# Patient Record
Sex: MALE | Race: WHITE | NOT HISPANIC OR LATINO | Employment: FULL TIME | URBAN - METROPOLITAN AREA
[De-identification: names, ages, dates, MRNs, and addresses within clinical notes are randomized per-mention and may not be internally consistent; named-entity substitution may affect disease eponyms.]

---

## 2022-08-06 ENCOUNTER — OFFICE VISIT (OUTPATIENT)
Dept: URGENT CARE | Facility: URGENT CARE | Age: 47
End: 2022-08-06
Payer: COMMERCIAL

## 2022-08-06 VITALS
TEMPERATURE: 97.8 F | DIASTOLIC BLOOD PRESSURE: 81 MMHG | WEIGHT: 205 LBS | SYSTOLIC BLOOD PRESSURE: 123 MMHG | HEART RATE: 83 BPM | OXYGEN SATURATION: 99 %

## 2022-08-06 DIAGNOSIS — S93.401A SPRAIN OF RIGHT ANKLE, UNSPECIFIED LIGAMENT, INITIAL ENCOUNTER: Primary | ICD-10-CM

## 2022-08-06 PROCEDURE — 99203 OFFICE O/P NEW LOW 30 MIN: CPT | Performed by: FAMILY MEDICINE

## 2022-08-06 NOTE — PROGRESS NOTES
Subjective: Last night patient rolled his right ankle pretty badly, got very swollen.  He was able to bear weight afterwards.  He has crutches and this morning he can bear weight but only on his heel.  It is pretty stiff.  He has had injuries to that right leg in the past leading to a foot drop.  His job is mostly nonphysical.    Objective: He has a lot of scar tissue on the right lower leg and a large amount of lateral malleolar swelling.  Ankle appears to be intact on movement but it is held fairly stiffly.  X-rays were done.  I do not see a fracture.He has old findings in the middle fibula and hardware in his foot    Assessment and plan: Right ankle sprain, moderately severe.  He would rather just give it time to heal rather than wear some kind of a splint.  Again he has crutches.  This will probably take 4 to 6 weeks to heal.

## 2022-10-03 ENCOUNTER — HEALTH MAINTENANCE LETTER (OUTPATIENT)
Age: 47
End: 2022-10-03

## 2023-02-17 ENCOUNTER — TRANSFERRED RECORDS (OUTPATIENT)
Dept: HEALTH INFORMATION MANAGEMENT | Facility: CLINIC | Age: 48
End: 2023-02-17

## 2023-02-17 LAB
ALT SERPL-CCNC: 18 IU/L (ref 0–44)
AST SERPL-CCNC: 17 IU/L (ref 0–40)
C TRACH DNA SPEC QL PROBE+SIG AMP: NEGATIVE
CHOLESTEROL (EXTERNAL): 174 MG/DL (ref 100–199)
CREATININE (EXTERNAL): 0.67 MG/DL (ref 0.76–1.27)
GFR ESTIMATED (EXTERNAL): 116 ML/MIN/1.73
GLUCOSE (EXTERNAL): 95 MG/DL (ref 70–99)
HDLC SERPL-MCNC: 55 MG/DL
HIV 1&2 EXT: NORMAL
LDL CHOLESTEROL CALCULATED (EXTERNAL): 109 MG/DL (ref 0–99)
N GONORRHOEA DNA SPEC QL PROBE+SIG AMP: NEGATIVE
POTASSIUM (EXTERNAL): 4.5 MMOL/L (ref 3.5–5.2)
SPECIMEN DESCRIP: NORMAL
SPECIMEN DESCRIPTION: NORMAL
TRIGLYCERIDES (EXTERNAL): 51 MG/DL (ref 0–149)
TSH SERPL-ACNC: 1.06 UIU/ML (ref 0.45–4.5)

## 2023-03-17 ENCOUNTER — PRE VISIT (OUTPATIENT)
Dept: UROLOGY | Facility: CLINIC | Age: 48
End: 2023-03-17
Payer: COMMERCIAL

## 2023-03-17 ENCOUNTER — TRANSCRIBE ORDERS (OUTPATIENT)
Dept: OTHER | Age: 48
End: 2023-03-17

## 2023-03-17 DIAGNOSIS — N52.9 ERECTILE DYSFUNCTION: Primary | ICD-10-CM

## 2023-03-17 DIAGNOSIS — R32 LEAKING OF URINE: ICD-10-CM

## 2023-03-17 DIAGNOSIS — R33.9 URINE RETENTION: ICD-10-CM

## 2023-03-17 NOTE — TELEPHONE ENCOUNTER
Action 23 [facility]   Action Taken  CSS contacted patient for outside records. Patient seen by PCC at [facility] for [employer] employees. Patient stated records are being faxed over. Patient gave consent to update medical records.       [facility] faxed records request to [facility] at [phone].      Action 2023 JTV 3:01 PM    Action Taken CSS called [facility] [place], Phone: [phone]. No Answer -- No voicemail. [facility] sent a message to Nurse to advise of no records.      Action 2023 JTV 8:27 AM    Action Taken CSS called [facility] and spoke with Chasidy. Chasidy confirmed she will fax over records today. Nurse Latoya was updated.     @12:45 PM, [facility] received records. Records sent to urgent scanning for processing. A copy of records sent to provider for review.      MEDICAL RECORDS REQUEST   [facility]  Urology Clinic  [address] [place] [address]  PHONE: [phone]  Fax: [phone]        FUTURE VISIT INFORMATION                                                   Galdino Ozzy Burns, : 1975 scheduled for future visit at [facility]    APPOINTMENT INFORMATION:    Date: 3/21/23    Provider:  Ermelinda Clark PA-C    Reason for Visit/Diagnosis: Urge Incontinence     REFERRAL INFORMATION:    Referring provider:  Radha Huerta CNP    Specialty: Internal Medicine    Referring providers clinic:  [facility]    Clinic contact number:      RECORDS REQUESTED FOR VISIT                                                     NOTES  STATUS/DETAILS   OFFICE NOTE from referring provider  yes   MEDICATION LIST  yes   LABS     URINALYSIS (UA)  yes     PRE-VISIT CHECKLIST      Record collection complete yes   Appointment appropriately scheduled           (right time/right provider) Yes   Joint diagnostic appointment coordinated correctly          (ensure right order & amount of time) Yes    MyChart activation Yes   Questionnaire complete Yes

## 2023-03-20 ENCOUNTER — PATIENT OUTREACH (OUTPATIENT)
Dept: UROLOGY | Facility: CLINIC | Age: 48
End: 2023-03-20
Payer: COMMERCIAL

## 2023-03-20 NOTE — TELEPHONE ENCOUNTER
Writer attempted to contact [facility] in [place] to have them send over pt records for appointment tomorrow.     There were several rings followed by a busy signal.     Will continue to follow as needed

## 2023-03-21 ENCOUNTER — VIRTUAL VISIT (OUTPATIENT)
Dept: UROLOGY | Facility: CLINIC | Age: 48
End: 2023-03-21
Payer: COMMERCIAL

## 2023-03-21 ENCOUNTER — PRE VISIT (OUTPATIENT)
Dept: UROLOGY | Facility: CLINIC | Age: 48
End: 2023-03-21

## 2023-03-21 DIAGNOSIS — N32.81 OVERACTIVE BLADDER: Primary | ICD-10-CM

## 2023-03-21 DIAGNOSIS — N39.41 URGE INCONTINENCE: ICD-10-CM

## 2023-03-21 PROCEDURE — 99204 OFFICE O/P NEW MOD 45 MIN: CPT | Mod: VID | Performed by: PHYSICIAN ASSISTANT

## 2023-03-21 RX ORDER — PROMETHAZINE HYDROCHLORIDE 25 MG/1
1 TABLET ORAL
COMMUNITY

## 2023-03-21 RX ORDER — GABAPENTIN 300 MG/1
300 CAPSULE ORAL
COMMUNITY
Start: 2021-05-17

## 2023-03-21 RX ORDER — VALACYCLOVIR HYDROCHLORIDE 500 MG/1
1 TABLET, FILM COATED ORAL
COMMUNITY
Start: 2022-05-06

## 2023-03-21 RX ORDER — VENLAFAXINE 100 MG/1
TABLET ORAL
COMMUNITY
Start: 2021-06-21

## 2023-03-21 RX ORDER — HYDROXYZINE HYDROCHLORIDE 50 MG/1
50 TABLET, FILM COATED ORAL
COMMUNITY
Start: 2021-05-17

## 2023-03-21 RX ORDER — TRIAMCINOLONE ACETONIDE 1 MG/G
CREAM TOPICAL
COMMUNITY
Start: 2023-02-17

## 2023-03-21 RX ORDER — TROSPIUM CHLORIDE ER 60 MG/1
60 CAPSULE ORAL EVERY MORNING
Qty: 30 CAPSULE | Refills: 11 | Status: SHIPPED | OUTPATIENT
Start: 2023-03-21

## 2023-03-21 RX ORDER — MIRTAZAPINE 30 MG/1
15 TABLET, FILM COATED ORAL AT BEDTIME
COMMUNITY
Start: 2023-02-21

## 2023-03-21 RX ORDER — TAMSULOSIN HYDROCHLORIDE 0.4 MG/1
CAPSULE ORAL
COMMUNITY
Start: 2022-05-21

## 2023-03-21 RX ORDER — ACYCLOVIR 200 MG/1
400 CAPSULE ORAL
COMMUNITY

## 2023-03-21 NOTE — PROGRESS NOTES
Name: Galdino Burns    MRN: [id number]   YOB: 1975                 Chief Complaint:   Lower urinary tract symptoms         Assessment and Plan:   47 year old male with mixed LUTS. Has been taking Flomax for close to a year with partial improvement. Continues to have significant irritative symptoms. We discussed that his fluid intake with consumption of large amounts of coffee and diet soda are a likely culprit. Advise that he cut back on caffeine intake and limit fluids before bed. Discussed additional treatment options to include pelvic floor physical therapy, pharmacotherapy for OAB, or further evaluation / additional treatment for BEAN. It was mutually agreed to proceed as follows:  -Referral to pelvic floor physical therapy.  -Trial of trospium ER 60 mg qAM. Side effects discussed.  -Reduce caffeine consumption.  -Continue tamsulosin.  -Records from [facility] in [place] requested.   -Follow up in 4-6 months to recheck.    Ermelinda Clark PA-C  March 21, 2023    ADDENDUM 3/22/23:  Received outside records [facility].  PSA on 2/17/23 was 0.8  STI testing on 2/17/23 was negative  Continue with the above plan.    Ermelinda Clark PA-C  Department of Urology          History of Present Illness:   Galdino Burns is a 47 year old male seen today via virtual visit in consultation from RUPAL Rincon CNP for evaluation of lower urinary tract symptoms. He is referred from [facility] in [place]; records unavailable at the time of today's visit.    Galdino describes lifelong urinary urgency. However, he has experienced more urge incontinence over the last 1-2 years. Rarely may have some stress urinary incontinence as well. He feels a lack of muscle control. Voids 7-9 times per day, nocturia x 0-2.     He has been taking Flomax since May 2022 which helps occasionally. He does report a generally weaker stream over the last 1-2 years. Feels to not always be emptying  his bladder completely and sometimes double voids. He occasionally has a split stream. Recalls a history of urethritis in college and wonders if there was some sort of structural change to his distal urethra as a result. Denies dysuria or gross hematuria. He had PSA checked through [facility] in Feb 2023 which was normal to his knowledge. No family history of prostate cancer or other prostate problems.    He drinks 8 cups of regular black coffee in the morning. Then 3 cans of diet soda in the afternoon interspersed with water. Also reports drinking a lot of water before bed. Occasionally drinks tea. 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          Past Medical History:   No past medical history on file.         Past Surgical History:   No past surgical history on file.         Social History:     Social History     Tobacco Use     Smoking status: Never     Smokeless tobacco: Never   Substance Use Topics     Alcohol use: Not on file            Family History:   No family history on file.         Allergies:   No Known Allergies         Medications:     No current outpatient medications on file.     No current facility-administered medications for this visit.             Review of Systems:    ROS: 14 point ROS neg other than the symptoms noted above in the HPI and PMH.          Physical Exam:   GENERAL: Healthy, alert and no distress  EYES: Eyes grossly normal to inspection.  No discharge or erythema, or obvious scleral/conjunctival abnormalities.  RESP: No audible wheeze, cough, or visible cyanosis.  No visible retractions or increased work of breathing.    SKIN: Visible skin clear. No significant rash, abnormal pigmentation or lesions.  NEURO: Cranial nerves grossly intact.  Mentation and speech appropriate for age.  PSYCH: Mentation appears normal, affect normal/bright, judgement and insight intact, normal speech and appearance well-groomed.       Labs:    None      Imaging:    None         Video-Visit Details    Type of  service:  Video Visit   Video Start Time: 8:29 AM  Video End Time:8:54 AM    Originating Location (pt. Location): Home    Distant Location (provider location):  Off-site  Platform used for Video Visit: [facility]      45 minutes spent on the date of the encounter doing chart review, patient visit and documentation

## 2023-03-21 NOTE — LETTER
3/21/2023       RE: Galdino Burns  [address]  [facility] [address]     Dear Colleague,    Thank you for referring your patient, Galdino Burns, to the [facility] [place] [facility]. Please see a copy of my visit note below.      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 Has been taking Flomax for close to a year with partial improvement. Continues to have significant irritative symptoms. We discussed that his fluid intake with consumption of large amounts of coffee and diet soda are a likely culprit. Advise that he cut back on caffeine intake and limit fluids before bed. Discussed additional treatment options to include pelvic floor physical therapy, pharmacotherapy for OAB, or further evaluation / additional treatment for BEAN. It was mutually agreed to proceed as follows:  -Referral to pelvic floor physical therapy.  -Trial of trospium ER 60 mg qAM. Side effects discussed.  -Reduce caffeine consumption.  -Continue tamsulosin.  -Records from [facility] in [place] requested.   -Follow up in 4-6 months to recheck.    Ermelinda Clark PA-C  March 21, 2023          History of Present Illness:   Galdino Burns is a 47 year old male seen today via virtual visit in consultation from RUPAL Rincon CNP for evaluation of lower urinary tract symptoms. He is referred from [facility] in [place]; records unavailable at the time of today's visit.    Galdino describes lifelong urinary urgency. However, he has experienced more urge incontinence over the last 1-2 years. Rarely may have some stress urinary incontinence as well. He feels a lack of muscle control. Voids 7-9 times per day, nocturia x 0-2.     He has been taking Flomax since May 2022 which helps  occasionally. He does report a generally weaker stream over the last 1-2 years. Feels to not always be emptying his bladder completely and sometimes double voids. He occasionally has a split stream. Recalls a history of urethritis in college and wonders if there was some sort of structural change to his distal urethra as a result. Denies dysuria or gross hematuria. He had PSA checked [facility] in Feb 2023 which was normal to his knowledge. No family history of prostate cancer or other prostate problems.    He drinks 8 cups of regular black coffee in the morning. Then 3 cans of diet soda in the afternoon interspersed with water. Also reports drinking a lot of water before bed. Occasionally drinks tea. 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          Past Medical History:   No past medical history on file.         Past Surgical History:   No past surgical history on file.         Social History:     Social History     Tobacco Use     Smoking status: Never     Smokeless tobacco: Never   Substance Use Topics     Alcohol use: Not on file            Family History:   No family history on file.         Allergies:   No Known Allergies         Medications:     No current outpatient medications on file.     No current facility-administered medications for this visit.             Review of Systems:    ROS: 14 point ROS neg other than the symptoms noted above in the HPI and PMH.          Physical Exam:   GENERAL: Healthy, alert and no distress  EYES: Eyes grossly normal to inspection.  No discharge or erythema, or obvious scleral/conjunctival abnormalities.  RESP: No audible wheeze, cough, or visible cyanosis.  No visible retractions or increased work of breathing.    SKIN: Visible skin clear. No significant rash, abnormal pigmentation or lesions.  NEURO: Cranial nerves grossly intact.  Mentation and speech appropriate for age.  PSYCH: Mentation appears normal, affect normal/bright, judgement and insight intact, normal speech  and appearance well-groomed.       Labs:    None      Imaging:    None         Video-Visit Details    Type of service:  Video Visit   Video Start Time: 8:29 AM  Video End Time:8:54 AM    Originating Location (pt. Location): Home    Distant Location (provider location):  Off-site  [facility]      45 minutes spent on the date of the encounter doing chart review, patient visit and documentation        Again, thank you for allowing me to participate in the care of your patient.      Sincerely,    Ermelinda Clark PA-C

## 2023-03-21 NOTE — PATIENT INSTRUCTIONS
UROLOGY CLINIC VISIT PATIENT INSTRUCTIONS    REDUCE YOUR CAFFEINE INTAKE. This will likely help your symptoms the most.    Start taking trospium ER (Sanctura XR) 60 mg once daily in the morning at least 30 minutes prior to breakfast. Watch for side effects to include dry eyes, dry mouth, constipation. Notify us if the medication is not covered by insurance.    Continue Flomax (tamsulosin) to promote complete bladder emptying.    Referral to pelvic floor physical therapy for pelvic floor muscle strengthening.     Physical Therapy Referral    Please call [phone] to make an appointment     [facility] - [email or web address]  [facility] - [email or web address]    Locations:    [facility] - U-Ortho PT [facility] - [facility]   [facility] [place]         We will again request records from [facility].    Follow up in 4-6 months to recheck.     If you have any issues, questions or concerns in the meantime, do not hesitate to contact us at [phone] or via [employer].     It was a pleasure meeting with you today.  Thank you for allowing me and my team the privilege of caring for you today.  YOU are the reason we are here, and I truly hope we provided you with the excellent service you deserve.  Please let us know if there is anything else we can do for you so that we can be sure you are leaving completely satisfied with your care experience.

## 2023-03-21 NOTE — LETTER
Date:March 22, 2023      Provider requested that no letter be sent. Do not send.       [facility]

## 2023-03-21 NOTE — NURSING NOTE
Is the patient currently in the state of [place]? YES    Visit mode:VIDEO    If the visit is dropped, the patient can be reconnected by: VIDEO VISIT: Text to cell phone: [phone]    Will anyone else be joining the visit? NO      How would you like to obtain your AVS? MyChart    Are changes needed to the allergy or medication list? NO    Reason for visit: Urgency and incontinence     Shelby Kocher

## 2023-03-22 ENCOUNTER — TELEPHONE (OUTPATIENT)
Dept: UROLOGY | Facility: CLINIC | Age: 48
End: 2023-03-22

## 2023-03-22 NOTE — TELEPHONE ENCOUNTER
Prior Authorization Retail Medication Request    Medication/Dose: trospium (SANCTURA XR) 60 MG CP24 24 hr capsule  ICD code (if different than what is on RX):  Overactive bladder [N32.81]  Previously Tried and Failed:    Rationale:      Insurance Name:  [employer]  Insurance ID:  [id number]    Pharmacy Information (if different than what is on RX)  [facility]:  [id number] [place], [place] - [address]  Phone:  [phone]

## 2023-03-23 ENCOUNTER — THERAPY VISIT (OUTPATIENT)
Dept: PHYSICAL THERAPY | Facility: CLINIC | Age: 48
End: 2023-03-23
Attending: PHYSICIAN ASSISTANT
Payer: COMMERCIAL

## 2023-03-23 DIAGNOSIS — N39.41 URGE INCONTINENCE: ICD-10-CM

## 2023-03-23 DIAGNOSIS — N32.81 OVERACTIVE BLADDER: ICD-10-CM

## 2023-03-23 DIAGNOSIS — M99.05 SOMATIC DYSFUNCTION OF PELVIS REGION: ICD-10-CM

## 2023-03-23 PROCEDURE — 97110 THERAPEUTIC EXERCISES: CPT | Mod: GP | Performed by: PHYSICAL THERAPIST

## 2023-03-23 PROCEDURE — 97535 SELF CARE MNGMENT TRAINING: CPT | Mod: GP | Performed by: PHYSICAL THERAPIST

## 2023-03-23 PROCEDURE — 97161 PT EVAL LOW COMPLEX 20 MIN: CPT | Mod: GP | Performed by: PHYSICAL THERAPIST

## 2023-03-23 PROCEDURE — 97530 THERAPEUTIC ACTIVITIES: CPT | Mod: GP | Performed by: PHYSICAL THERAPIST

## 2023-03-23 NOTE — TELEPHONE ENCOUNTER
Central Prior Authorization Team   Phone: [phone]      .PA Initiation    Medication: trospium (SANCTURA XR) 60 MG CP24 24 hr capsule  Insurance Company: [employer] ([facility]) - Phone [phone] Fax [phone]  Pharmacy Filling the Rx: [id number] [address]  Filling Pharmacy Phone: [phone]  Filling Pharmacy Fax:    Start Date: 3/23/2023
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
## 2023-03-23 NOTE — PROGRESS NOTES
[facility]    OUTPATIENT Physical Therapy ORTHOPEDIC EVALUATION  PLAN OF TREATMENT FOR OUTPATIENT REHABILITATION  (COMPLETE FOR INITIAL CLAIMS ONLY)  Patient's Last Name, First Name, M.I.  YOB: 1975  Galdino Burns    Provider s Name:  [facility]   Medical Record No.  [id number]   Start of Care Date:  03/23/23   Onset Date:   03/21/23 (date patient saw MD for this issue)   Treatment Diagnosis:  urinary urgency/urge incontinence Medical Diagnosis:     Overactive bladder  Urge incontinence  Somatic dysfunction of pelvis region       Goals:     03/23/23 0700   Voiding Patterns   Previous Functional Level Number of times patient experiences urgency during day   Current Functional Level Number of times patient experiences urgency during day   Peformance level Has to urinate again less than 2 hours after finished urinating more than half the time. Always has difficulty postponing urination. Needs to void within 15-30 min of drinking coffee.   STG Target Performance Reduce number of times patient experiences urgency during the day   Performance Level 8-10 times   Rationale continence throughout the day;work toward normal voiding intervals to focus on ADLS, work, or school   Due Date 05/22/23   LTG Target Performance Reduce number of times patient experiences urgency during the day   Performance Level 6-8 times   Rationale continence throughout the day;work toward normal voiding intervals to focus on ADLS, work, or school   Due Date 06/21/23   Fluid Intake    Current Functional Level Consuming high amount of bladder irritants   Performance Level 6 cups of coffee   STG Target Performance Identify bladder irritants/correct fluid intake   Performance Level Reduce coffee consumption to  (3 cups of coffee)   Rationale to facilitate treatment and reduce total treatment  visits   Due Date 05/22/23         Therapy Frequency:  2x/month  Predicted Duration of Therapy Intervention:  90 days    Karin Schroeder, PT                 I CERTIFY THE NEED FOR THESE SERVICES FURNISHED UNDER        THIS PLAN OF TREATMENT AND WHILE UNDER MY CARE     (Physician attestation of this document indicates review and certification of the therapy plan).                     Certification Date From:  03/23/23   Certification Date To:  06/20/23    Referring Provider:  Ermelinda Clark    Initial Assessment        See Epic Evaluation SOC Date: 03/23/23

## 2023-03-23 NOTE — PROGRESS NOTES
03/23/23 1000   AUA (American U[employer] Association) Symptom Score   1. Over the past month, how often have you had a sensation of not emptying your bladder completely after you finished urinating? 3   2. Over the past month, how often have you had to urinate again less than two hours after you finished urinating? 4   3. Over the past month, how often have you found you stopped and started again several times when you urinated? 0   4. Over the past month, how often have you found it difficult to postpone urination? 5   5. Over the past month, how often have you had a weak urine stream? 3   6. Over the past month, how often have you had to push or strain to begin urinating? 0   7. Over the past month, how many times did you typically get up to urinate from the time you went to bed at night until the time you got up in the morning? 1   AUA Score 16

## 2023-03-23 NOTE — PROGRESS NOTES
03/23/23 0700   Voiding Patterns   Previous Functional Level Number of times patient experiences urgency during day   Current Functional Level Number of times patient experiences urgency during day   Peformance level Has to urinate again less than 2 hours after finished urinating more than half the time. Always has difficulty postponing urination. Needs to void within 15-30 min of drinking coffee.   STG Target Performance Reduce number of times patient experiences urgency during the day   Performance Level 8-10 times   Rationale continence throughout the day;work toward normal voiding intervals to focus on ADLS, work, or school   Due Date 05/22/23   LTG Target Performance Reduce number of times patient experiences urgency during the day   Performance Level 6-8 times   Rationale continence throughout the day;work toward normal voiding intervals to focus on ADLS, work, or school   Due Date 06/21/23   Fluid Intake    Current Functional Level Consuming high amount of bladder irritants   Performance Level 6 cups of coffee   STG Target Performance Identify bladder irritants/correct fluid intake   Performance Level Reduce coffee consumption to  (3 cups of coffee)   Rationale to facilitate treatment and reduce total treatment visits   Due Date 05/22/23

## 2023-03-23 NOTE — PROGRESS NOTES
SUBJECTIVE:  Patient reports onset of symptoms urgency and urge incontinence for several years. Symptoms include leaking with urge.  Since onset symptoms have been getting better, worse or staying the same? worse    Urination:  Do you leak on the way to the bathroom or with a strong urge to void? yes    Do you leak with cough,sneeze, jumping, running?No   Any other activities that cause leaking? Yes    Do you have triggers that make you feel you can't wait to go to the bathroom? Yes     When you leak what is the amount? depends    How long can you delay the need to urinate? Not long.   How many times do you get up to urinate at night? 0-2    Can you stop the flow of urine when on the toilet? No  Is the volume of urine passed usually: small. (8 sec rule=  250ml with average bladder storing  400-600ml)    Do you strain to pass urine? No  Do you have a slow or hesitant urinary stream? sometimes  Do you have difficulty initiating the urine stream? No    Fluid intake(one glass is 8oz or one cup) 6 glasses/day, 6 caffinated glasses/day  0  alcohol glasses/day.    Bowel habits:  Frequency of bowel movements?once a day  Consistency of stool? soft formed, hard,   Do you ignore the urge to defecate? No  Do you strain to pass stool? No    Pelvic Pain:  Do you have pain with erections? No  Do you have pain with ejaculation? No  Do you have pain with sitting?  No  Any other activities that produce the pelvic pain    Are you sexually active?Yes  Have you ever been worried for your physical safety? No  Any abdominal or pelvic surgeries? Hip replacement  Are you having any regular exercise?no  Have you practiced the PF(Kegel) exercises for 4 or more weeks?no    OBSERVATION  Right leg shorter with Trendelenburg gait  ROM      Passive Hip ROM: slight decrease in right hip mobility compared to left   MUSCLE PERFORMANCE  Baseline PF tone:normal  PF Tone with cough: not tested  Valsalva: not tested  PF Response quality:  moderate    PALPATION: Non-tender all superficial structures with digital evaluation    FUNCTIONAL QUESTIONNAIRES:  Physical Therapy Initial Evaluation  Subjective:  The history is provided by the patient. No  was used.   Therapist Generated HPI Evaluation  Problem details: On 3/21/2023 patient saw the urologist for issues with urinary urgency and urge incontinence..

## 2023-03-27 NOTE — TELEPHONE ENCOUNTER
PRIOR AUTHORIZATION DENIED    Medication: trospium (SANCTURA XR) 60 MG CP24 24 hr capsule    Denial Date: 3/27/2023    Denial Rational:           Appeal Information:

## 2023-04-20 ENCOUNTER — TRANSFERRED RECORDS (OUTPATIENT)
Dept: MULTI SPECIALTY CLINIC | Facility: CLINIC | Age: 48
End: 2023-04-20

## 2023-04-25 PROBLEM — M99.05 SOMATIC DYSFUNCTION OF PELVIS REGION: Status: RESOLVED | Noted: 2023-03-23 | Resolved: 2023-04-25

## 2023-04-25 NOTE — PROGRESS NOTES
Patient seen for one time evaluation and treatment.  Patient did not return for further treatment and current status is unknown. Patient canceled follow up visit stating it was not needed and would reschedule if needed. Please see initial evaluation for further information.

## 2023-07-19 ENCOUNTER — HOSPITAL ENCOUNTER (EMERGENCY)
Facility: CLINIC | Age: 48
Discharge: HOME | End: 2023-07-19
Attending: FAMILY MEDICINE | Admitting: FAMILY MEDICINE
Payer: COMMERCIAL

## 2023-07-19 ENCOUNTER — TELEPHONE (OUTPATIENT)
Dept: FAMILY MEDICINE | Facility: CLINIC | Age: 48
End: 2023-07-19
Payer: COMMERCIAL

## 2023-07-19 VITALS
DIASTOLIC BLOOD PRESSURE: 83 MMHG | TEMPERATURE: 98.7 F | OXYGEN SATURATION: 100 % | SYSTOLIC BLOOD PRESSURE: 121 MMHG | HEART RATE: 53 BPM | RESPIRATION RATE: 16 BRPM

## 2023-07-19 DIAGNOSIS — S61.412A LACERATION OF LEFT HAND WITHOUT FOREIGN BODY, INITIAL ENCOUNTER: ICD-10-CM

## 2023-07-19 PROCEDURE — 99283 EMERGENCY DEPT VISIT LOW MDM: CPT | Performed by: FAMILY MEDICINE

## 2023-07-19 PROCEDURE — 12002 RPR S/N/AX/GEN/TRNK2.6-7.5CM: CPT

## 2023-07-19 PROCEDURE — 250N000013 HC RX MED GY IP 250 OP 250 PS 637: Performed by: FAMILY MEDICINE

## 2023-07-19 PROCEDURE — 99284 EMERGENCY DEPT VISIT MOD MDM: CPT | Performed by: FAMILY MEDICINE

## 2023-07-19 RX ORDER — IBUPROFEN 800 MG/1
800 TABLET, FILM COATED ORAL ONCE
Status: COMPLETED | OUTPATIENT
Start: 2023-07-19 | End: 2023-07-19

## 2023-07-19 RX ORDER — CEPHALEXIN 500 MG/1
500 CAPSULE ORAL 3 TIMES DAILY
Qty: 21 CAPSULE | Refills: 0 | Status: SHIPPED | OUTPATIENT
Start: 2023-07-19 | End: 2023-07-26

## 2023-07-19 RX ORDER — LIDOCAINE HYDROCHLORIDE 10 MG/ML
30 INJECTION, SOLUTION EPIDURAL; INFILTRATION; INTRACAUDAL; PERINEURAL ONCE
Status: DISCONTINUED | OUTPATIENT
Start: 2023-07-19 | End: 2023-07-19 | Stop reason: HOSPADM

## 2023-07-19 RX ORDER — OXYCODONE AND ACETAMINOPHEN 5; 325 MG/1; MG/1
1 TABLET ORAL EVERY 6 HOURS PRN
Qty: 12 TABLET | Refills: 0 | Status: SHIPPED | OUTPATIENT
Start: 2023-07-19 | End: 2023-07-22

## 2023-07-19 RX ORDER — OXYCODONE AND ACETAMINOPHEN 5; 325 MG/1; MG/1
1 TABLET ORAL ONCE
Status: COMPLETED | OUTPATIENT
Start: 2023-07-19 | End: 2023-07-19

## 2023-07-19 RX ADMIN — IBUPROFEN 800 MG: 800 TABLET ORAL at 13:29

## 2023-07-19 RX ADMIN — OXYCODONE HYDROCHLORIDE AND ACETAMINOPHEN 1 TABLET: 5; 325 TABLET ORAL at 13:29

## 2023-07-19 ASSESSMENT — ACTIVITIES OF DAILY LIVING (ADL)
ADLS_ACUITY_SCORE: 35
ADLS_ACUITY_SCORE: 33

## 2023-07-19 NOTE — DISCHARGE INSTRUCTIONS
Keep wound clean and dry 2 to 3 days sutures out in 7 to 10 days follow-up with your primary clinic start Keflex 3 times a day x1 week.

## 2023-07-19 NOTE — ED NOTES
Pt wanting pain medications but did not want to wait for writer to ask MD. Pt given discharge paperwork.

## 2023-07-19 NOTE — CONSULTS
[facility]  Orthopedic Surgery Consult    Name: Galdino Bunrs  Age: 47 year old  MRN: [id number]  YOB: 1975    Reason for Consult: Left Hand Laceration    Requesting Provider: Marlo Ontiveros MD    Assessment and Plan:     Assessment:  47-year-old male who sustained a hand laceration or injury around 1230 today with a hand saw.    We approximated the laceration today.    Brief Procedure Note:  After verbal informed consent was obtained, and the patient elected to proceed, a brief time out was held in accordance with hospital policy confirming the correct patient, procedure, site, and side 10cc of lidocaine was used to superficially numb the skin around the laceration. Sterile water was used to irrigate the proximal subcutaneous laceration, measuring 4cm by 1cm  and the distal superficial laceration measuring 2cm by 1cm. The left hand was then prepped and draped in the usual sterile fashion using iodine. The proximal laceration extended subcutaneously but not deep into the muscle layer. Both lacerations were contaminated. Only the proximal laceration was approximated as the distal laceration was primarily superficial. 3-0 Prolene was used to approximate skin edges using a simple interrupted pattern. The patient tolerated the procedure well and no immediate complications were observed. He was neurovascularly intact after the procedure.  He was dressed with Xeroform, 4 x 4's and, Kerlix.     He should receive Keflex for 1 week.  He is to follow-up with Dr. Merritt in 1 to 2 weeks for a skin check.    We have messaged our schedulers to set the follow-up appointment.    Plan:  - Plan for OR: None  - Anticoagulation/DVT prophylaxis: None needed  - Antibiotics: Keflex 1 week  - Imaging: None needed  - Activity: No restrictions  - Weight bearing: WBAT  - Pain control: Defer to ED  - Diet: Cleared from our standpoint  - Follow-up: 1-2 weeks with Dr. Merritt  - Disposition:  Home    Staff: Brittanie Merritt MD    Respectfully,    Trung Albert MD  Orthopedic Surgery PGY1  [phone]    Please page me directly with any questions/concerns during regular weekday hours before 5 pm. If there is no response, if it is a weekend, or if it is during evening hours then please page the orthopedic surgery resident on call.    History of Present Illness:     Patient was seen and examined by me. History, PMH, Meds, SH, complete ROS (10 organ systems) and PE reviewed with patient and prior medical records.      47-year-old male that presented with a left hand laceration after using a hand saw at 1230 today.   Laceration extends into the subcutaneous layer but above the muscle layer.  He also has distal superficial lacerations near the finger.  He denies any muscle weakness, numbness, or tingling in the hand.  He states he has full use of all his fingers since the injury without any deficits.      Past Medical History:     History reviewed. No pertinent past medical history.    Past Surgical History:     History reviewed. No pertinent surgical history.    Social History:     Social History     Socioeconomic History    Marital status: Single     Spouse name: None    Number of children: None    Years of education: None    Highest education level: None   Tobacco Use    Smoking status: Former     Types: Cigarettes     Quit date:      Years since quittin.5    Smokeless tobacco: Never       Family History:     No family history on file.    Medications:     Current Facility-Administered Medications   Medication    lidocaine (PF) (XYLOCAINE) 1 % injection 30 mL     Current Outpatient Medications   Medication Sig    acyclovir (ZOVIRAX) 200 MG capsule Take 400 mg by mouth    cephALEXin (KEFLEX) 500 MG capsule Take 1 capsule (500 mg) by mouth 3 times daily for 7 days    gabapentin (NEURONTIN) 300 MG capsule Take 300 mg by mouth    hydrOXYzine (ATARAX) 50 MG tablet Take 50 mg by mouth    mirtazapine  (REMERON) 30 MG tablet Take 15 mg by mouth At Bedtime    Multiple Vitamins-Iron TABS Take 1 tablet by mouth    tamsulosin (FLOMAX) 0.4 MG capsule     valACYclovir (VALTREX) 500 MG tablet Take 1 tablet by mouth 2 times daily    venlafaxine (EFFEXOR) 100 MG tablet     triamcinolone (KENALOG) 0.1 % external cream APPLY THIN LAYER TOPICALLY TO THE AFFECTED AREA TWICE DAILY FOR 1 TO 2 WEEKS. DO NOT USE FOR LONGER THAN 2 WEEKS AT A TIME    trospium (SANCTURA XR) 60 MG CP24 24 hr capsule Take 1 capsule (60 mg) by mouth every morning       Allergies:     Allergies   Allergen Reactions    Peanut-Containing Drug Products Hives    Chocolate Hives and Rash       Review of Systems:     A comprehensive 10 point review of systems (constitutional, ENT, cardiac, peripheral vascular, respiratory, GI, , musculoskeletal, skin, neurological) was performed and found to be negative except as described in this note.      Physical Exam:     Vital Signs: BP (!) 147/90   Pulse 87   Temp 98.7  F (37.1  C) (Oral)   Resp 16   SpO2 98%   General: Resting comfortably in bed, awake, alert, no apparent distress, appears stated age.    Musculoskeletal:  LUE: No gross deformity. Has incision over dorsal aspect of left hand that is deep to muscle subcutaneous layer, not breaching muscle layer. Also has distal superficial lacerations. Both wounds are contaminated. Full active/passive ROM w/o pain or crepitus. Fires wrist extension/flexion, , EPL, intrinsics, FPL. Can make OK sign, jame-cross sign, thumbs up and thumbs into palm without difficulty. SILT in radial, ulnar, and median nerve distribution. Radial pulse 2+ and fingers warm and well-perfused      Imaging:     None for this encounter reviewed    Data:     CBC:  No results found for: WBC, HGB, PLT  BMP:  No results found for: NA, POTASSIUM, CHLORIDE, CO2, BUN, CR, ANIONGAP, MARITA, GLC  Inflammatory Markers:  No results found for: WBC, CRP, SED

## 2023-07-19 NOTE — TELEPHONE ENCOUNTER
Patient called while he was driving himself to either urgent care or the ER. Galdino wanted to know which one he should go to due to power tool accident in his garage.     Left hand (most likely). Removed skin on forefinger between finger and thumb. Patient states he can see the muscle.   Was bleeding a lot. Patient states he was able to get the bleeding to stop.     PCP Pooja Dhillon stated to go to the ER. Patient lives closest to [place] so is heading there.     Patient states that he is not nauseous or dizzy and has a high pain tolerance.   Tool: small hand held router.     Corry Albert, MELAN RN  [facility]

## 2023-07-26 NOTE — TELEPHONE ENCOUNTER
DIAGNOSIS: LEFT HAND LACERATION- ED FOLLOWUP   APPOINTMENT DATE: 08/04/2023   NOTES STATUS DETAILS   DISCHARGE REPORT from the ER Internal 07/19/2023 - [facility] ED   MEDICATION LIST Internal    PHOTOGRAPHS Internal

## 2023-08-04 ENCOUNTER — PRE VISIT (OUTPATIENT)
Dept: ORTHOPEDICS | Facility: CLINIC | Age: 48
End: 2023-08-04

## 2023-09-08 ENCOUNTER — PRE VISIT (OUTPATIENT)
Dept: UROLOGY | Facility: CLINIC | Age: 48
End: 2023-09-08
Payer: COMMERCIAL

## 2023-11-19 ENCOUNTER — HEALTH MAINTENANCE LETTER (OUTPATIENT)
Age: 48
End: 2023-11-19

## 2024-12-29 ENCOUNTER — HEALTH MAINTENANCE LETTER (OUTPATIENT)
Age: 49
End: 2024-12-29

## 2025-06-30 PROBLEM — K40.90 NON-RECURRENT UNILATERAL INGUINAL HERNIA WITHOUT OBSTRUCTION OR GANGRENE: Status: ACTIVE | Noted: 2025-06-27

## 2025-07-02 NOTE — TELEPHONE ENCOUNTER
FUTURE VISIT INFORMATION      SURGERY INFORMATION:  Date: 7/15/25  [place] [facility] [place]geon:  Dr. Marlo Cat  Anesthesia Type:  General  Procedure: HERNIORRHAPHY, INGUINAL, ROBOT-ASSISTED       RECORDS REQUESTED FROM:       Primary Care Provider: Radha Smith NP    Pertinent Medical History: urge incontinence     Most recent EKG+ Tracin23

## 2025-07-09 ENCOUNTER — LAB (OUTPATIENT)
Dept: LAB | Facility: CLINIC | Age: 50
End: 2025-07-09
Payer: COMMERCIAL

## 2025-07-09 ENCOUNTER — OFFICE VISIT (OUTPATIENT)
Dept: SURGERY | Facility: CLINIC | Age: 50
End: 2025-07-09
Payer: COMMERCIAL

## 2025-07-09 ENCOUNTER — ANESTHESIA EVENT (OUTPATIENT)
Dept: SURGERY | Facility: AMBULATORY SURGERY CENTER | Age: 50
End: 2025-07-09
Payer: COMMERCIAL

## 2025-07-09 ENCOUNTER — PRE VISIT (OUTPATIENT)
Dept: SURGERY | Facility: CLINIC | Age: 50
End: 2025-07-09

## 2025-07-09 VITALS
RESPIRATION RATE: 16 BRPM | OXYGEN SATURATION: 98 % | WEIGHT: 177 LBS | HEIGHT: 73 IN | SYSTOLIC BLOOD PRESSURE: 111 MMHG | HEART RATE: 59 BPM | DIASTOLIC BLOOD PRESSURE: 72 MMHG | TEMPERATURE: 98.8 F | BODY MASS INDEX: 23.46 KG/M2

## 2025-07-09 DIAGNOSIS — Z01.818 PREOP EXAMINATION: Primary | ICD-10-CM

## 2025-07-09 DIAGNOSIS — K40.90 NON-RECURRENT UNILATERAL INGUINAL HERNIA WITHOUT OBSTRUCTION OR GANGRENE: ICD-10-CM

## 2025-07-09 DIAGNOSIS — Z01.818 PREOP EXAMINATION: ICD-10-CM

## 2025-07-09 LAB
CREAT SERPL-MCNC: 0.91 MG/DL (ref 0.67–1.17)
EGFRCR SERPLBLD CKD-EPI 2021: >90 ML/MIN/1.73M2
ERYTHROCYTE [DISTWIDTH] IN BLOOD BY AUTOMATED COUNT: 12.5 % (ref 10–15)
HCT VFR BLD AUTO: 42.4 % (ref 40–53)
HGB BLD-MCNC: 13.9 G/DL (ref 13.3–17.7)
MCH RBC QN AUTO: 29.7 PG (ref 26.5–33)
MCHC RBC AUTO-ENTMCNC: 32.8 G/DL (ref 31.5–36.5)
MCV RBC AUTO: 91 FL (ref 78–100)
PLATELET # BLD AUTO: 244 10E3/UL (ref 150–450)
RBC # BLD AUTO: 4.68 10E6/UL (ref 4.4–5.9)
WBC # BLD AUTO: 6 10E3/UL (ref 4–11)

## 2025-07-09 PROCEDURE — 36415 COLL VENOUS BLD VENIPUNCTURE: CPT | Performed by: PATHOLOGY

## 2025-07-09 PROCEDURE — 82565 ASSAY OF CREATININE: CPT | Performed by: PATHOLOGY

## 2025-07-09 PROCEDURE — 99203 OFFICE O/P NEW LOW 30 MIN: CPT | Performed by: CLINICAL NURSE SPECIALIST

## 2025-07-09 PROCEDURE — 85027 COMPLETE CBC AUTOMATED: CPT | Performed by: PATHOLOGY

## 2025-07-09 RX ORDER — KETOCONAZOLE 20 MG/ML
SHAMPOO, SUSPENSION TOPICAL DAILY PRN
COMMUNITY
Start: 2025-01-07

## 2025-07-09 ASSESSMENT — ENCOUNTER SYMPTOMS
SEIZURES: 1
DYSRHYTHMIAS: 0

## 2025-07-09 ASSESSMENT — PAIN SCALES - GENERAL: PAINLEVEL_OUTOF10: NO PAIN (0)

## 2025-07-09 ASSESSMENT — LIFESTYLE VARIABLES: TOBACCO_USE: 1
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
## 2025-07-09 NOTE — PATIENT INSTRUCTIONS
Preparing for Your Surgery      Name:  Galdino Burns   MRN:  [id number]   :  1975   Today's Date:  2025       [employer] [place] [employer] [address] [employer] Project                                Timeline 2025 -2025    This project will affect travel to the [facility], as well as the [facility].    Please check the [employer] [address] project website for the most up to date information and give yourself additional time to reach your destination.      Arriving for surgery:  Surgery date:  7/15/25  Arrival time:  11.05AM    Restrictions due to COVID 19:    Please maintain social distance.  Masking is optional.      parking is available for anyone with mobility limitations or disabilities. (Monday- Friday 7 am- 5 pm)    Please come to:    [facility]  [address] [place] [phone]    Please check in on the 5th floor at the Ambulatory Surgery Center.      What can I eat or drink?    -  You may eat and drink normally until 8 hours prior to arrival  time. (Until 3.05AM)  -  You may have clear liquids until 2 hours prior to arrival  time. (Until 9.05AM)    Examples of clear liquids:  Water  Clear broth  Juices (apple, white grape, white cranberry  and cider) without pulp  Noncarbonated, powder based beverages  (lemonade and Mikey-Aid)  Sodas (Sprite, 7-Up, ginger ale and seltzer)  Coffee or tea (without milk or cream)  Gatorade      Which medicines can I take?    Hold Aspirin for 7 days before surgery.   Hold Multivitamins for 7 days before surgery.  Hold Supplements for 7 days before surgery.  Hold Ibuprofen (Advil, Motrin) for 1 day before surgery--unless otherwise directed by surgeon.  Hold Naproxen (Aleve) for 4 days before surgery.    No alcohol or cannabis including edibles products for 24 hours prior to procedure.      -  DO NOT take the following medications the day of  surgery:  External cream.      -  PLEASE TAKE the following medications the day of surgery:   None     Valacyclovir can continue at bedtime per usual.    How do I prepare myself?  - Please take 2 showers (one the night prior to surgery and one the morning of surgery) using Scrubcare or Hibiclens soap.    Use this soap only from the neck to your toes. Avoid genital area      Leave the soap on your skin for one minute--then rinse thoroughly.      You may use your own shampoo and conditioner. No other hair products.   - Please remove all jewelry and body piercings.  - No lotions, deodorants or fragrance.  - No makeup or fingernail polish.   - Bring your ID and insurance card.    -If you have a Deep Brain Stimulator, a Spinal Cord Stimulator, or any implanted Neuro Device, you must bring the remote to the Surgery Center.         ALL PATIENTS ARE REQUIRED TO HAVE A RESPONSIBLE ADULT TO DRIVE AND BE IN ATTENDANCE WITH THEM FOR 24 HOURS FOLLOWING SURGERY.     Covid testing policy as of 12/06/2022  Your surgeon will notify and schedule you for a COVID test if one is needed before surgery--please direct any questions or COVID symptoms to your surgeon      Questions or Concerns:    -For questions regarding the day of surgery, please contact the Ambulatory Surgery Center at [phone].    -If you have health changes between today and your surgery, please contact your surgeon.     - For questions after surgery, please contact your surgeon's office.

## 2025-07-15 ENCOUNTER — ANESTHESIA (OUTPATIENT)
Dept: SURGERY | Facility: AMBULATORY SURGERY CENTER | Age: 50
End: 2025-07-15
Payer: COMMERCIAL

## 2025-07-15 ENCOUNTER — HOSPITAL ENCOUNTER (OUTPATIENT)
Facility: AMBULATORY SURGERY CENTER | Age: 50
Discharge: HOME | End: 2025-07-15
Attending: SURGERY
Payer: COMMERCIAL

## 2025-07-15 VITALS
HEIGHT: 73 IN | TEMPERATURE: 98 F | SYSTOLIC BLOOD PRESSURE: 117 MMHG | HEART RATE: 50 BPM | DIASTOLIC BLOOD PRESSURE: 78 MMHG | BODY MASS INDEX: 23.59 KG/M2 | WEIGHT: 178 LBS | OXYGEN SATURATION: 100 % | RESPIRATION RATE: 16 BRPM

## 2025-07-15 DIAGNOSIS — K40.90 NON-RECURRENT UNILATERAL INGUINAL HERNIA WITHOUT OBSTRUCTION OR GANGRENE: Primary | ICD-10-CM

## 2025-07-15 PROCEDURE — S2900 ROBOTIC SURGICAL SYSTEM: HCPCS | Performed by: SURGERY

## 2025-07-15 PROCEDURE — 49650 LAP ING HERNIA REPAIR INIT: CPT | Mod: GC | Performed by: SURGERY

## 2025-07-15 PROCEDURE — 49650 LAP ING HERNIA REPAIR INIT: CPT | Performed by: SURGERY

## 2025-07-15 DEVICE — MESH PROGRIP LAPAROSCOPIC 5.9X3.9" PARIETEX SELF-FIX LPG1510: Type: IMPLANTABLE DEVICE | Site: ABDOMEN | Status: FUNCTIONAL

## 2025-07-15 RX ORDER — CEFAZOLIN SODIUM 2 G/50ML
2 SOLUTION INTRAVENOUS SEE ADMIN INSTRUCTIONS
Status: DISCONTINUED | OUTPATIENT
Start: 2025-07-15 | End: 2025-07-15 | Stop reason: HOSPADM

## 2025-07-15 RX ORDER — DEXAMETHASONE SODIUM PHOSPHATE 10 MG/ML
4 INJECTION, SOLUTION INTRAMUSCULAR; INTRAVENOUS
Status: DISCONTINUED | OUTPATIENT
Start: 2025-07-15 | End: 2025-07-15 | Stop reason: HOSPADM

## 2025-07-15 RX ORDER — ONDANSETRON 2 MG/ML
4 INJECTION INTRAMUSCULAR; INTRAVENOUS EVERY 30 MIN PRN
Status: DISCONTINUED | OUTPATIENT
Start: 2025-07-15 | End: 2025-07-16 | Stop reason: HOSPADM

## 2025-07-15 RX ORDER — ONDANSETRON 2 MG/ML
INJECTION INTRAMUSCULAR; INTRAVENOUS PRN
Status: DISCONTINUED | OUTPATIENT
Start: 2025-07-15 | End: 2025-07-15

## 2025-07-15 RX ORDER — PROPOFOL 10 MG/ML
INJECTION, EMULSION INTRAVENOUS CONTINUOUS PRN
Status: DISCONTINUED | OUTPATIENT
Start: 2025-07-15 | End: 2025-07-15

## 2025-07-15 RX ORDER — LABETALOL HYDROCHLORIDE 5 MG/ML
10 INJECTION, SOLUTION INTRAVENOUS
Status: DISCONTINUED | OUTPATIENT
Start: 2025-07-15 | End: 2025-07-15 | Stop reason: HOSPADM

## 2025-07-15 RX ORDER — HYDROMORPHONE HYDROCHLORIDE 1 MG/ML
0.2 INJECTION, SOLUTION INTRAMUSCULAR; INTRAVENOUS; SUBCUTANEOUS EVERY 5 MIN PRN
Status: DISCONTINUED | OUTPATIENT
Start: 2025-07-15 | End: 2025-07-16 | Stop reason: HOSPADM

## 2025-07-15 RX ORDER — NALOXONE HYDROCHLORIDE 0.4 MG/ML
0.1 INJECTION, SOLUTION INTRAMUSCULAR; INTRAVENOUS; SUBCUTANEOUS
Status: DISCONTINUED | OUTPATIENT
Start: 2025-07-15 | End: 2025-07-16 | Stop reason: HOSPADM

## 2025-07-15 RX ORDER — OXYCODONE HYDROCHLORIDE 5 MG/1
5 TABLET ORAL
Status: COMPLETED | OUTPATIENT
Start: 2025-07-15 | End: 2025-07-15

## 2025-07-15 RX ORDER — ACETAMINOPHEN 325 MG/1
975 TABLET ORAL ONCE
Status: COMPLETED | OUTPATIENT
Start: 2025-07-15 | End: 2025-07-15

## 2025-07-15 RX ORDER — ONDANSETRON 2 MG/ML
4 INJECTION INTRAMUSCULAR; INTRAVENOUS EVERY 30 MIN PRN
Status: DISCONTINUED | OUTPATIENT
Start: 2025-07-15 | End: 2025-07-15 | Stop reason: HOSPADM

## 2025-07-15 RX ORDER — MEPERIDINE HYDROCHLORIDE 25 MG/ML
12.5 INJECTION INTRAMUSCULAR; INTRAVENOUS; SUBCUTANEOUS EVERY 5 MIN PRN
Status: DISCONTINUED | OUTPATIENT
Start: 2025-07-15 | End: 2025-07-15 | Stop reason: HOSPADM

## 2025-07-15 RX ORDER — DEXAMETHASONE SODIUM PHOSPHATE 10 MG/ML
4 INJECTION, SOLUTION INTRAMUSCULAR; INTRAVENOUS
Status: DISCONTINUED | OUTPATIENT
Start: 2025-07-15 | End: 2025-07-16 | Stop reason: HOSPADM

## 2025-07-15 RX ORDER — ONDANSETRON 4 MG/1
4 TABLET, ORALLY DISINTEGRATING ORAL EVERY 30 MIN PRN
Status: DISCONTINUED | OUTPATIENT
Start: 2025-07-15 | End: 2025-07-16 | Stop reason: HOSPADM

## 2025-07-15 RX ORDER — KETOROLAC TROMETHAMINE 30 MG/ML
INJECTION, SOLUTION INTRAMUSCULAR; INTRAVENOUS PRN
Status: DISCONTINUED | OUTPATIENT
Start: 2025-07-15 | End: 2025-07-15

## 2025-07-15 RX ORDER — FENTANYL CITRATE 50 UG/ML
25 INJECTION, SOLUTION INTRAMUSCULAR; INTRAVENOUS EVERY 5 MIN PRN
Status: DISCONTINUED | OUTPATIENT
Start: 2025-07-15 | End: 2025-07-15 | Stop reason: HOSPADM

## 2025-07-15 RX ORDER — DEXAMETHASONE SODIUM PHOSPHATE 4 MG/ML
INJECTION, SOLUTION INTRA-ARTICULAR; INTRALESIONAL; INTRAMUSCULAR; INTRAVENOUS; SOFT TISSUE PRN
Status: DISCONTINUED | OUTPATIENT
Start: 2025-07-15 | End: 2025-07-15

## 2025-07-15 RX ORDER — CEFAZOLIN SODIUM 2 G/50ML
2 SOLUTION INTRAVENOUS
Status: COMPLETED | OUTPATIENT
Start: 2025-07-15 | End: 2025-07-15

## 2025-07-15 RX ORDER — HYDROMORPHONE HYDROCHLORIDE 1 MG/ML
0.4 INJECTION, SOLUTION INTRAMUSCULAR; INTRAVENOUS; SUBCUTANEOUS EVERY 5 MIN PRN
Status: DISCONTINUED | OUTPATIENT
Start: 2025-07-15 | End: 2025-07-15 | Stop reason: HOSPADM

## 2025-07-15 RX ORDER — ONDANSETRON 4 MG/1
4 TABLET, ORALLY DISINTEGRATING ORAL EVERY 30 MIN PRN
Status: DISCONTINUED | OUTPATIENT
Start: 2025-07-15 | End: 2025-07-15 | Stop reason: HOSPADM

## 2025-07-15 RX ORDER — SODIUM CHLORIDE, SODIUM LACTATE, POTASSIUM CHLORIDE, CALCIUM CHLORIDE 600; 310; 30; 20 MG/100ML; MG/100ML; MG/100ML; MG/100ML
INJECTION, SOLUTION INTRAVENOUS CONTINUOUS
Status: DISCONTINUED | OUTPATIENT
Start: 2025-07-15 | End: 2025-07-16 | Stop reason: HOSPADM

## 2025-07-15 RX ORDER — HYDROMORPHONE HYDROCHLORIDE 1 MG/ML
0.2 INJECTION, SOLUTION INTRAMUSCULAR; INTRAVENOUS; SUBCUTANEOUS EVERY 5 MIN PRN
Status: DISCONTINUED | OUTPATIENT
Start: 2025-07-15 | End: 2025-07-15 | Stop reason: HOSPADM

## 2025-07-15 RX ORDER — FENTANYL CITRATE 50 UG/ML
50 INJECTION, SOLUTION INTRAMUSCULAR; INTRAVENOUS EVERY 5 MIN PRN
Status: DISCONTINUED | OUTPATIENT
Start: 2025-07-15 | End: 2025-07-15 | Stop reason: HOSPADM

## 2025-07-15 RX ORDER — FENTANYL CITRATE 50 UG/ML
50 INJECTION, SOLUTION INTRAMUSCULAR; INTRAVENOUS EVERY 5 MIN PRN
Status: DISCONTINUED | OUTPATIENT
Start: 2025-07-15 | End: 2025-07-16 | Stop reason: HOSPADM

## 2025-07-15 RX ORDER — PROPOFOL 10 MG/ML
INJECTION, EMULSION INTRAVENOUS PRN
Status: DISCONTINUED | OUTPATIENT
Start: 2025-07-15 | End: 2025-07-15

## 2025-07-15 RX ORDER — FENTANYL CITRATE 50 UG/ML
25 INJECTION, SOLUTION INTRAMUSCULAR; INTRAVENOUS EVERY 5 MIN PRN
Status: DISCONTINUED | OUTPATIENT
Start: 2025-07-15 | End: 2025-07-16 | Stop reason: HOSPADM

## 2025-07-15 RX ORDER — FENTANYL CITRATE 50 UG/ML
25 INJECTION, SOLUTION INTRAMUSCULAR; INTRAVENOUS
Status: DISCONTINUED | OUTPATIENT
Start: 2025-07-15 | End: 2025-07-16 | Stop reason: HOSPADM

## 2025-07-15 RX ORDER — HYDROMORPHONE HYDROCHLORIDE 1 MG/ML
0.4 INJECTION, SOLUTION INTRAMUSCULAR; INTRAVENOUS; SUBCUTANEOUS EVERY 5 MIN PRN
Status: DISCONTINUED | OUTPATIENT
Start: 2025-07-15 | End: 2025-07-16 | Stop reason: HOSPADM

## 2025-07-15 RX ORDER — SODIUM CHLORIDE, SODIUM LACTATE, POTASSIUM CHLORIDE, CALCIUM CHLORIDE 600; 310; 30; 20 MG/100ML; MG/100ML; MG/100ML; MG/100ML
INJECTION, SOLUTION INTRAVENOUS CONTINUOUS
Status: DISCONTINUED | OUTPATIENT
Start: 2025-07-15 | End: 2025-07-15 | Stop reason: HOSPADM

## 2025-07-15 RX ORDER — OXYCODONE HYDROCHLORIDE 5 MG/1
5-10 TABLET ORAL EVERY 4 HOURS PRN
Qty: 20 TABLET | Refills: 0 | Status: SHIPPED | OUTPATIENT
Start: 2025-07-15

## 2025-07-15 RX ORDER — LIDOCAINE HYDROCHLORIDE 20 MG/ML
INJECTION, SOLUTION INFILTRATION; PERINEURAL PRN
Status: DISCONTINUED | OUTPATIENT
Start: 2025-07-15 | End: 2025-07-15

## 2025-07-15 RX ORDER — FENTANYL CITRATE 50 UG/ML
INJECTION, SOLUTION INTRAMUSCULAR; INTRAVENOUS PRN
Status: DISCONTINUED | OUTPATIENT
Start: 2025-07-15 | End: 2025-07-15

## 2025-07-15 RX ORDER — LIDOCAINE 40 MG/G
CREAM TOPICAL
Status: DISCONTINUED | OUTPATIENT
Start: 2025-07-15 | End: 2025-07-15 | Stop reason: HOSPADM

## 2025-07-15 RX ORDER — OXYCODONE HYDROCHLORIDE 5 MG/1
10 TABLET ORAL
Status: DISCONTINUED | OUTPATIENT
Start: 2025-07-15 | End: 2025-07-16 | Stop reason: HOSPADM

## 2025-07-15 RX ORDER — NALOXONE HYDROCHLORIDE 0.4 MG/ML
0.1 INJECTION, SOLUTION INTRAMUSCULAR; INTRAVENOUS; SUBCUTANEOUS
Status: DISCONTINUED | OUTPATIENT
Start: 2025-07-15 | End: 2025-07-15 | Stop reason: HOSPADM

## 2025-07-15 RX ADMIN — Medication 50 MG: at 12:44

## 2025-07-15 RX ADMIN — SODIUM CHLORIDE, SODIUM LACTATE, POTASSIUM CHLORIDE, CALCIUM CHLORIDE: 600; 310; 30; 20 INJECTION, SOLUTION INTRAVENOUS at 11:27

## 2025-07-15 RX ADMIN — PROPOFOL 200 MG: 10 INJECTION, EMULSION INTRAVENOUS at 12:44

## 2025-07-15 RX ADMIN — CEFAZOLIN SODIUM 2 G: 2 SOLUTION INTRAVENOUS at 12:38

## 2025-07-15 RX ADMIN — ONDANSETRON 4 MG: 2 INJECTION INTRAMUSCULAR; INTRAVENOUS at 13:50

## 2025-07-15 RX ADMIN — KETOROLAC TROMETHAMINE 15 MG: 30 INJECTION, SOLUTION INTRAMUSCULAR; INTRAVENOUS at 13:50

## 2025-07-15 RX ADMIN — DEXAMETHASONE SODIUM PHOSPHATE 4 MG: 4 INJECTION, SOLUTION INTRA-ARTICULAR; INTRALESIONAL; INTRAMUSCULAR; INTRAVENOUS; SOFT TISSUE at 12:54

## 2025-07-15 RX ADMIN — Medication 20 MG: at 13:31

## 2025-07-15 RX ADMIN — OXYCODONE HYDROCHLORIDE 5 MG: 5 TABLET ORAL at 14:09

## 2025-07-15 RX ADMIN — FENTANYL CITRATE 25 MCG: 50 INJECTION, SOLUTION INTRAMUSCULAR; INTRAVENOUS at 14:27

## 2025-07-15 RX ADMIN — FENTANYL CITRATE 100 MCG: 50 INJECTION, SOLUTION INTRAMUSCULAR; INTRAVENOUS at 12:43

## 2025-07-15 RX ADMIN — PROPOFOL 200 MCG/KG/MIN: 10 INJECTION, EMULSION INTRAVENOUS at 12:43

## 2025-07-15 RX ADMIN — ACETAMINOPHEN 975 MG: 325 TABLET ORAL at 11:25

## 2025-07-15 RX ADMIN — LIDOCAINE HYDROCHLORIDE 100 MG: 20 INJECTION, SOLUTION INFILTRATION; PERINEURAL at 12:44

## 2025-07-15 ASSESSMENT — ENCOUNTER SYMPTOMS: SEIZURES: 1
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
## 2025-07-15 NOTE — ANESTHESIA CARE TRANSFER NOTE
Patient: Galdino Burns    Procedure: Procedure(s):  HERNIORRHAPHY, INGUINAL, ROBOT-ASSISTED       Diagnosis: Non-recurrent unilateral inguinal hernia without obstruction or gangrene [K40.90]  Diagnosis Additional Information: No value filed.    Anesthesia Type:   General     Note:    Oropharynx: oropharynx clear of all foreign objects and spontaneously breathing  Level of Consciousness: awake  Oxygen Supplementation: face mask  Level of Supplemental Oxygen (L/min / FiO2): 6  Independent Airway: airway patency satisfactory and stable  Dentition: dentition unchanged  Vital Signs Stable: post-procedure vital signs reviewed and stable  Report to RN Given: handoff report given  Patient transferred to: PACU    Handoff Report: Identifed the Patient, Identified the Reponsible Provider, Reviewed the pertinent medical history, Discussed the surgical course, Reviewed Intra-OP anesthesia mangement and issues during anesthesia, Set expectations for post-procedure period and Allowed opportunity for questions and acknowledgement of understanding    Vitals:  Vitals Value Taken Time   BP     Temp     Pulse     Resp     SpO2         Electronically Signed By: RUPAL Burgess CRNA  July 15, 2025  2:07 PM

## 2025-07-15 NOTE — PROGRESS NOTES
Unable to reach Sudhir by phone x2. Sent secure compliant text with one time reply allowed with update.

## 2025-07-15 NOTE — ANESTHESIA POSTPROCEDURE EVALUATION
Patient: Galdino Burns    Procedure: Procedure(s):  HERNIORRHAPHY, INGUINAL, ROBOT-ASSISTED       Anesthesia Type:  General    Note:  Disposition: Outpatient   Postop Pain Control: Uneventful            Sign Out: Well controlled pain   PONV: No   Neuro/Psych: Uneventful            Sign Out: Acceptable/Baseline neuro status   Airway/Respiratory: Uneventful            Sign Out: Acceptable/Baseline resp. status   CV/Hemodynamics: Uneventful            Sign Out: Acceptable CV status; No obvious hypovolemia; No obvious fluid overload   Other NRE: NONE   DID A NON-ROUTINE EVENT OCCUR? No           Last vitals:  Vitals Value Taken Time   /80 07/15/25 14:30   Temp 36.8  C (98.2  F) 07/15/25 14:30   Pulse 55 07/15/25 14:33   Resp 6 07/15/25 14:33   SpO2 98 % 07/15/25 14:33   Vitals shown include unfiled device data.    Electronically Signed By: Kevin Negron MD  July 15, 2025  3:43 PM

## 2025-07-15 NOTE — DISCHARGE INSTRUCTIONS
[facility]  Home Care Following Anesthesia  For 24 hours after surgery:  Get plenty of rest.  A responsible adult must stay with you for at least 24 hours after you leave the surgery center.  Do not drive or use heavy equipment.  If you have weakness or tingling, don't drive or use heavy equipment until this feeling goes away.   Do not drink alcohol.   Avoid strenuous or risky activities.  Ask for help when climbing stairs.  You may feel lightheaded.  IF so, sit for a few minutes before standing.  Have someone help you get up.   If you have nausea (feel sick to your stomach): Drink only clear liquids such as apple juice, ginger ale, broth or 7-Up.  Rest may also help.  Be sure to drink enough fluids.  Move to a regular diet as you feel able.   You may have a slight fever.  Call the doctor if your fever is over 100 F (37.7 C) (taken under the tongue) or lasts longer than 24 hours.  You may have a dry mouth, a sore throat, muscle aches or trouble sleeping. These should go away after 24 hours.  Do not make important or legal decisions.   It is recommended to avoid smoking.               Tips for taking pain medications  To get the best pain relief possible, remember these points:  Take pain medications as directed, before pain becomes severe.  Pain medication can upset your stomach: taking it with food may help.  Constipation is a common side effect of pain medication. Drink plenty of  fluids.  Eat foods high in fiber. Take a stool softener if recommended by your doctor or pharmacist.  Do not drink alcohol, drive or operate machinery while taking pain medications.  Ask about other ways to control pain, such as with heat, ice or relaxation.    Tylenol/Acetaminophen Consumption    If you feel your pain relief is insufficient, you may take Tylenol/Acetaminophen in addition to your narcotic pain medication.   Be careful not to exceed 4,000 mg of Tylenol/Acetaminophen in a 24 hour  period from all sources.  If you are taking extra strength Tylenol/acetaminophen (500 mg), the maximum dose is 8 tablets in 24 hours.  If you are taking regular strength acetaminophen (325 mg), the maximum dose is 12 tablets in 24 hours.  Last dose of Tylenol received at 1150am 975mg. May have next dose after 550pm.  May have ibuprofen after 745pm.     Call a doctor for any of the following:  Signs of infection (fever, growing tenderness at the surgery site, a large amount of drainage or bleeding, severe pain, foul-smelling drainage, redness, swelling).  It has been over 8 to 10 hours since surgery and you are still not able to urinate (pass water).  Headache for over 24 hours.  Numbness, tingling or weakness the day after surgery (if you had spinal anesthesia).  Signs of Covid-19 infection (temperature over 100 degrees, shortness of breath, cough, loss of taste/smell, generalized body aches, persistent headache, chills, sore throat, nausea/vomiting/diarrhea)    Your doctor is:       Dr. Marlo Cat, General Surgery: [phone]               After hours and weekends call the hospital @ [phone] and ask for the resident on call for:  General Surgery  For emergency care, call the:  [facility]:  [phone] (TTY for hearing impaired: [phone])

## 2025-07-15 NOTE — ANESTHESIA PROCEDURE NOTES
Airway       Patient location during procedure: OR       Procedure Start/Stop Times: 7/15/2025 12:52 PM  Staff -        Anesthesiologist:  Kevin Negron MD       CRNA: Lottie Patel APRN CRNA       Performed By: CRNA and other anesthesia staff  Consent for Airway        Urgency: elective  Indications and Patient Condition       Indications for airway management: izabela-procedural and airway protection         Mask difficulty assessment: 2 - vent by mask + OA or adjuvant +/- NMBA    Final Airway Details       Final airway type: endotracheal airway       Successful airway: ETT - single and Oral  Endotracheal Airway Details        ETT size (mm): 7.5       Cuffed: yes       Cuff volume (mL): 8       Successful intubation technique: direct laryngoscopy       DL Blade Type: MAC 3       Grade View of Cords: 3 (omega shaped epiglottis with no view of vocal cords with DL. Blind placement with success.)       Adjucts: stylet       Position: Right       Measured from: gums/teeth       Secured at (cm): 22       Bite block used: None    Post intubation assessment        Placement verified by: capnometry, equal breath sounds and chest rise        Number of attempts at approach: 2 (first attempt by medical student. second attmept by anesthesiologist.)       Secured with: tape       Ease of procedure: easy       Dentition: Intact    Medication(s) Administered   Medication Administration Time: 7/15/2025 12:52 PM

## 2025-07-15 NOTE — OP NOTE
[facility]    Operative Note    Pre-operative diagnosis: Non-recurrent unilateral inguinal hernia without obstruction or gangrene [K40.90]   Post-operative diagnosis * same   Procedure: Procedure(s):  HERNIORRHAPHY, INGUINAL, ROBOT-ASSISTED   Surgeon: Surgeons and Role:     * Marlo Cat MD - Primary     * Anai Acevedo MD - Resident - Assisting   Anesthesia: General    Estimated blood loss: 10 ml   Drains:    Specimens: * No specimens in log *   Findings: Findings on this side included:  A large direct hernia with incarcerated fat above the peritoneum. Lipoma of the cord without true indirect hernia   Complications: .   Implants: .         OPERATIVE INDICATIONS:  Galdino Burns is a 49 year old male with a history of a lump in the right  groin.      After understanding the risks and benefits of proceeding with surgery, the patient has an indication for laparoscopic inguinal hernia repair with robot assist and consented to undergo surgery.    I reviewed the risks of surgery with Galdino Burns .    These include, but are not limited to, death, myocardial infarction, pneumonia, urinary tract infection, deep venous thrombosis with or without pulmonary embolus, abdominal infection from bowel injury or abscess, bowel obstruction, wound infection, and bleeding.    The risks of hernia repair were reviewed with the patient.    These risks combine the risks of abdominal surgery and the risks of hernia repair, including mesh implantation, and were described to the patient as follows:    Abdominal surgery risks:    These include, but are not limited to, death, myocardial infarction, pneumonia, urinary tract infection, deep venous thrombosis with or without pulmonary embolus, abdominal infection from bowel injury or abscess, bowel obstruction, wound infection, and bleeding.    Hernia repair risks:    Food and Drug Administration Comments on Hernia Repair Surgery and  Mesh Implantation.    http://www.fda.gov/MedicalDevices/ProductsandMedicalProcedures/ImplantsandProsthetics/HerniaSurgicalMesh/default.htm      Hernia Repair Complications    Based on FDA s analysis of medical device adverse event reports and of peer-reviewed, scientific literature, the most common adverse events for all surgical repair of hernias--with or without mesh--are pain, infection, hernia recurrence, scar-like tissue that sticks tissues together (adhesion), blockage of the large or small intestine (obstruction), bleeding, abnormal connection between organs, vessels, or intestines (fistula), fluid build-up at the surgical site (seroma), and a hole in neighboring tissues or organs (perforation).    The most common adverse events following hernia repair with mesh are pain, infection, hernia recurrence, adhesion, and bowel obstruction. Some other potential adverse events that can occur following hernia repair with mesh are mesh migration and mesh shrinkage (contraction).    Many complications related to hernia repair with surgical mesh that have been reported to the FDA have been associated with recalled mesh products that are no longer on the market. Pain, infection, recurrence, adhesion, obstruction, and perforation are the most common complications associated with recalled mesh. In the FDA s analysis of medical adverse event reports to the FDA, recalled mesh products were the main cause of bowel perforation and obstruction complications.    Please refer to the recall notices here and here for more information if you have recalled mesh. For more information on the recalled products, please visit the FDA Medical Device Recall website. Please visit the Medical & Radiation Emitting Device Database to search a specific type of surgical mesh.    If you are unsure about the specific mesh  and brand used in your surgery and have questions about your hernia repair, contact your surgeon or the facility  where your surgery was performed to obtain the information from your medical record.           OPERATIVE DETAILS:  The patient was brought to the operating room and prepared in a routine fashion.  A timeout was performed prior to surgery and documented by the nursing team. Patient was flexed and placed Trendelenberg.     Under the benefits of general anesthesia, the patient was positioned supine and L arm was tucked.      The operation was started by making an incision at the LUQ. A Veres needle was inserted and insufflation to 15mmHg was achieved. Under direct vision,. Two additional 8mm ports placed across the abdomen, avodiing tethered bowel/adhesions from prior laparotomy.     Mesh and 1 #3-0 Stratafix 6 inch were placed in the abdomen. Robot was docked and insturments inserted.        Attention was first turned to the R inguinal location.     A generous peritoneal flap was made by incising 6cm above internal ring, and extending the flap medially to the medial ligament, and laterally past the ASIS. A wide lateral pocket was first developed, taking care to protect sensory nerves. A medial dissection was then done, exposing Coopers ligament. During this portion, the large direct defevct was reduced, and fat removed from the transversalis area.      A complete dissection of the space was performed by pushing peritoneum down and elevating the inferior epigastric arteries.     Findings on this side included a large direct hernia with a large direct hernia sac.  Cord structures were identified. There was  cord lipoma which was dissected posteriorly and into the properitoneal space.  The vas deferens was dissected along with the cord vessels.    Hemostasis was ensured.        After complete dissection of the inguinal space, a piece of mesh (see above for type of mesh) was unfurled in the right groin .  Appropriate coverage was made of the hernia locations, including the direct, indirect, and femoral locations.  The  lower lip of the mesh was placed above the peritoneal reflection.        Tacking was not used.Complete hemostasis was achieved.     Next, pneumo was reduced to 8mmHg. The flap was closed with running 3-0 Stratafix x2 continuously. Needles  removed under direct visiobn     The properitoneal space was desufflated under direct visualization. I scrubbed back in and undocked robot.  Port were removed.  25 ml local used at the port sites and to create R inguinal block.     The skin was closed at the 3 incisions using 4-0 monocryl suture and skin glue was applied.     I was present for all critical components of the operation and all needle and sponge counts were correct x2 at the end of the procedure.     Marlo Cat MD     Surgery  [phone] (hospital )  [phone] (clinic nurses)

## 2025-07-17 ENCOUNTER — PATIENT OUTREACH (OUTPATIENT)
Dept: ENDOCRINOLOGY | Facility: CLINIC | Age: 50
End: 2025-07-17
Payer: COMMERCIAL

## 2025-07-17 NOTE — PROGRESS NOTES
RN Post-Op/Post-Discharge Care Coordination Note    Mr. Galdino Burns is a 49 year old male who underwent right Inguinal hernia repair, laparoscopic robot-assisted on 07/15/25 with  Marlo Cat MD.  Spoke with Patient.    Support  Patient able to care for self independently     Health Status  Fevers/chills: Patient denies any fever or chills.  Nausea/Vomiting: Patient denies nausea/vomiting.  Eating/drinking: Patient is able to eat and drink without any complaints.  Bowel habits: Patient reports having a normal bowel movement.  Drains (TATA): N/A  Incisions: Patient denies any signs and symptoms of infection..  Wound closure:  Skin Sealant  Pain: 4 on a scale of 0-10.  Using Ibuprofen, Tylenol and occasional oxycodone for pain control.  New Medications:  oxycodone    Activity/Restrictions  No lifting in excess of 15-20 pounds for 3-4 weeks    Equipment  Athletic Supporter    Pathology reviewed with patient:  No, not yet available    Forms/Letters  No    All of his questions were answered including reviewing restrictions, and wound care.  He will call this office if he has any further questions and/or concerns.      Whom and When to Call  Patient acknowledges understanding of how to manage any medication changes and   when to seek medical care.     Patient advised that if after hour medical concerns arise to please call [phone] and choose option 4 to speak to the physician on call.

## 2025-07-24 ENCOUNTER — MYC MEDICAL ADVICE (OUTPATIENT)
Dept: INTERNAL MEDICINE | Facility: CLINIC | Age: 50
End: 2025-07-24

## 2025-07-24 ENCOUNTER — OFFICE VISIT (OUTPATIENT)
Dept: INTERNAL MEDICINE | Facility: CLINIC | Age: 50
End: 2025-07-24
Payer: COMMERCIAL

## 2025-07-24 VITALS
DIASTOLIC BLOOD PRESSURE: 68 MMHG | WEIGHT: 177.1 LBS | TEMPERATURE: 97.2 F | SYSTOLIC BLOOD PRESSURE: 106 MMHG | RESPIRATION RATE: 14 BRPM | OXYGEN SATURATION: 99 % | HEART RATE: 59 BPM | HEIGHT: 73 IN | BODY MASS INDEX: 23.47 KG/M2

## 2025-07-24 DIAGNOSIS — B00.9 HERPES SIMPLEX VIRUS INFECTION: Primary | ICD-10-CM

## 2025-07-24 DIAGNOSIS — H61.22 IMPACTED CERUMEN OF LEFT EAR: ICD-10-CM

## 2025-07-24 DIAGNOSIS — B35.0 TINEA CAPITIS: ICD-10-CM

## 2025-07-24 DIAGNOSIS — G89.4 CHRONIC PAIN SYNDROME: ICD-10-CM

## 2025-07-24 DIAGNOSIS — L30.8 OTHER ECZEMA: ICD-10-CM

## 2025-07-24 DIAGNOSIS — M21.70 ACQUIRED SHORT LEG SYNDROME ON RIGHT: ICD-10-CM

## 2025-07-24 DIAGNOSIS — B00.9 HERPES SIMPLEX VIRUS INFECTION: ICD-10-CM

## 2025-07-24 RX ORDER — KETOCONAZOLE 20 MG/ML
SHAMPOO, SUSPENSION TOPICAL DAILY PRN
Qty: 120 ML | Refills: 1 | Status: SHIPPED | OUTPATIENT
Start: 2025-07-24

## 2025-07-24 RX ORDER — VALACYCLOVIR HYDROCHLORIDE 1 G/1
1000 TABLET, FILM COATED ORAL DAILY
Qty: 90 TABLET | Refills: 3 | Status: SHIPPED | OUTPATIENT
Start: 2025-07-24

## 2025-07-24 RX ORDER — VALACYCLOVIR HYDROCHLORIDE 500 MG/1
1000 TABLET, FILM COATED ORAL AT BEDTIME
Status: CANCELLED | OUTPATIENT
Start: 2025-07-24

## 2025-07-24 RX ORDER — VALACYCLOVIR HYDROCHLORIDE 500 MG/1
1000 TABLET, FILM COATED ORAL DAILY
Qty: 90 TABLET | Refills: 3 | Status: SHIPPED | OUTPATIENT
Start: 2025-07-24 | End: 2025-07-24

## 2025-07-24 RX ORDER — TRIAMCINOLONE ACETONIDE 1 MG/G
CREAM TOPICAL PRN
Qty: 15 G | Refills: 2 | Status: SHIPPED | OUTPATIENT
Start: 2025-07-24

## 2025-07-24 ASSESSMENT — PAIN SCALES - GENERAL: PAINLEVEL_OUTOF10: MILD PAIN (3)
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
## 2025-08-28 ENCOUNTER — THERAPY VISIT (OUTPATIENT)
Dept: PHYSICAL THERAPY | Facility: REHABILITATION | Age: 50
End: 2025-08-28
Attending: INTERNAL MEDICINE
Payer: COMMERCIAL

## 2025-08-28 DIAGNOSIS — R29.898 WEAKNESS OF RIGHT HIP: ICD-10-CM

## 2025-08-28 DIAGNOSIS — M79.671 RIGHT FOOT PAIN: Primary | ICD-10-CM

## 2025-08-28 DIAGNOSIS — G89.4 CHRONIC PAIN SYNDROME: ICD-10-CM

## 2025-08-28 DIAGNOSIS — Z96.641 HISTORY OF TOTAL RIGHT HIP REPLACEMENT: ICD-10-CM

## 2025-08-28 PROBLEM — G89.29 CHRONIC PAIN: Status: ACTIVE | Noted: 2025-08-28

## (undated) DEVICE — DAVINCI XI DRAPE ARM 470015

## (undated) DEVICE — BLADE CLIPPER DISP 4406

## (undated) DEVICE — DAVINCI XI SEAL UNIVERSAL 5-8MM 470361

## (undated) DEVICE — SU DERMABOND ADVANCED .7ML DNX12

## (undated) DEVICE — TUBING SMOKE EVAC PNEUVIEW 9660-XE

## (undated) DEVICE — SU MONOCRYL 4-0 PS-2 27" UND Y426H

## (undated) DEVICE — Device

## (undated) DEVICE — SURGICEL HEMOSTAT 4X8" 1952S

## (undated) DEVICE — DRAPE MAYO STAND 23X54 8337

## (undated) DEVICE — SUPPORTER ATHLETIC LG LATEX 202636

## (undated) DEVICE — NDL INSUFFLATION 13GA 120MM C2201

## (undated) DEVICE — PREP CHLORAPREP 26ML TINTED HI-LITE ORANGE 930815

## (undated) DEVICE — SU STRATAFIX PDS PLUS 3-0 SPIRAL SH 15CM SXPP1B420

## (undated) DEVICE — DAVINCI HOT SHEARS TIP COVER  400180

## (undated) DEVICE — GOWN XLG DISP 9545

## (undated) DEVICE — DAVINCI XI MONOPOLAR SCISSORS HOT SHEARS 8MM 470179

## (undated) DEVICE — SU VICRYL+ 3-0 27IN SH UND VCP416H

## (undated) DEVICE — PACK LAP CHOLE CUSTOM ASC

## (undated) DEVICE — SUCTION MANIFOLD NEPTUNE 2 SYS 1 PORT 702-025-000

## (undated) DEVICE — DAVINCI XI DRAPE COLUMN 470341

## (undated) DEVICE — DAVINCI XI NDL DRIVER MEGA SUTURE CUT 8MM 470309

## (undated) DEVICE — DAVINCI XI FCP BIPOLAR FENESTRATED 470205

## (undated) DEVICE — LINEN TOWEL PACK X5 5464

## (undated) DEVICE — ESU GROUND PAD ADULT W/CORD E7507

## (undated) DEVICE — SOL WATER IRRIG 500ML BOTTLE 2F7113

## (undated) DEVICE — SYR 50ML SLIP TIP W/O NDL 309654

## (undated) RX ORDER — GLYCOPYRROLATE 0.2 MG/ML
INJECTION, SOLUTION INTRAMUSCULAR; INTRAVENOUS
Status: DISPENSED
Start: 2025-07-15

## (undated) RX ORDER — DEXAMETHASONE SODIUM PHOSPHATE 4 MG/ML
INJECTION, SOLUTION INTRA-ARTICULAR; INTRALESIONAL; INTRAMUSCULAR; INTRAVENOUS; SOFT TISSUE
Status: DISPENSED
Start: 2025-07-15

## (undated) RX ORDER — OXYCODONE HYDROCHLORIDE 5 MG/1
TABLET ORAL
Status: DISPENSED
Start: 2025-07-15

## (undated) RX ORDER — CEFAZOLIN SODIUM 2 G/50ML
SOLUTION INTRAVENOUS
Status: DISPENSED
Start: 2025-07-15

## (undated) RX ORDER — FENTANYL CITRATE 50 UG/ML
INJECTION, SOLUTION INTRAMUSCULAR; INTRAVENOUS
Status: DISPENSED
Start: 2025-07-15

## (undated) RX ORDER — ACETAMINOPHEN 325 MG/1
TABLET ORAL
Status: DISPENSED
Start: 2025-07-15

## (undated) RX ORDER — ONDANSETRON 2 MG/ML
INJECTION INTRAMUSCULAR; INTRAVENOUS
Status: DISPENSED
Start: 2025-07-15

## (undated) RX ORDER — PROPOFOL 10 MG/ML
INJECTION, EMULSION INTRAVENOUS
Status: DISPENSED
Start: 2025-07-15